# Patient Record
Sex: FEMALE | Race: WHITE | NOT HISPANIC OR LATINO | URBAN - NONMETROPOLITAN AREA
[De-identification: names, ages, dates, MRNs, and addresses within clinical notes are randomized per-mention and may not be internally consistent; named-entity substitution may affect disease eponyms.]

---

## 2022-07-11 DIAGNOSIS — R07.9 CHEST PAIN, UNSPECIFIED: ICD-10-CM

## 2022-07-11 DIAGNOSIS — E11.9 TYPE 2 DIABETES MELLITUS WITHOUT COMPLICATIONS (HCC): ICD-10-CM

## 2024-08-25 RX ORDER — METHADONE HYDROCHLORIDE 5 MG/1
TABLET ORAL
COMMUNITY
Start: 2024-03-01

## 2024-08-25 RX ORDER — ASPIRIN 81 MG/1
81 TABLET ORAL
COMMUNITY
Start: 2024-03-01

## 2024-08-25 RX ORDER — LISINOPRIL 2.5 MG/1
2.5 TABLET ORAL DAILY
COMMUNITY

## 2024-08-25 RX ORDER — OXYCODONE AND ACETAMINOPHEN 5; 325 MG/1; MG/1
1 TABLET ORAL DAILY
COMMUNITY

## 2024-08-25 RX ORDER — ATORVASTATIN CALCIUM 20 MG/1
20 TABLET, FILM COATED ORAL
COMMUNITY
Start: 2024-03-01

## 2024-08-30 ENCOUNTER — OFFICE VISIT (OUTPATIENT)
Dept: PODIATRY | Age: 64
End: 2024-08-30
Payer: OTHER GOVERNMENT

## 2024-08-30 VITALS
WEIGHT: 230.2 LBS | DIASTOLIC BLOOD PRESSURE: 80 MMHG | SYSTOLIC BLOOD PRESSURE: 136 MMHG | TEMPERATURE: 98.5 F | HEART RATE: 88 BPM | BODY MASS INDEX: 38.35 KG/M2 | HEIGHT: 65 IN | OXYGEN SATURATION: 96 %

## 2024-08-30 DIAGNOSIS — M79.671 PAIN IN BOTH FEET: ICD-10-CM

## 2024-08-30 DIAGNOSIS — B35.1 ONYCHOMYCOSIS: ICD-10-CM

## 2024-08-30 DIAGNOSIS — M79.672 PAIN IN BOTH FEET: ICD-10-CM

## 2024-08-30 DIAGNOSIS — M20.42 HAMMER TOE OF LEFT FOOT: ICD-10-CM

## 2024-08-30 DIAGNOSIS — E11.65 CONTROLLED TYPE 2 DIABETES MELLITUS WITH HYPERGLYCEMIA, UNSPECIFIED WHETHER LONG TERM INSULIN USE (HCC): Primary | ICD-10-CM

## 2024-08-30 PROCEDURE — 99203 OFFICE O/P NEW LOW 30 MIN: CPT | Performed by: STUDENT IN AN ORGANIZED HEALTH CARE EDUCATION/TRAINING PROGRAM

## 2024-08-30 RX ORDER — BACLOFEN 10 MG/1
10 TABLET ORAL AS NEEDED
COMMUNITY
Start: 2024-07-19

## 2024-08-30 RX ORDER — CICLOPIROX 80 MG/ML
SOLUTION TOPICAL
Qty: 6 ML | Refills: 3 | Status: SHIPPED | OUTPATIENT
Start: 2024-08-30

## 2024-08-30 NOTE — PROGRESS NOTES
Ambulatory Visit  Name: Estefania Thompson      : 1960      MRN: [id number]  Encounter Provider: Rose Garcia DPM  Encounter Date: 2024   Encounter department: [facility] PODIATRY [place]    Assessment & Plan   1. Controlled type 2 diabetes mellitus with hyperglycemia, unspecified whether long term insulin use (HCC)  2. Onychomycosis  -     ciclopirox (PENLAC) 8 % solution; Apply topically daily at bedtime File nail in thickness weekly  3. Pain in both feet  4. Hammer toe of left foot      IMPRESSION:  DM, A1c 9.1% 7/15/24  R2 onychomycosis  B/L foot pain, fatigue   Left 2/3 HTs     PLAN:  I reviewed clinical exam with patient in detail today. I have discussed with the patient the pathophysiology of this diagnosis and reviewed how the examination correlates with this diagnosis.  PCP note from 7/15/24 reviewed  DFE as below. Patient does not meet Q findings at this time thus RFC not covered  I discussed treatment options for toenail fungus with patient in great detail today. Patient has decided on topical penlac daily application (with weekly filing in thickness) for 6-12mo. We discussed that nails take anywhere from 6-12 months to fully grow out and thus she should continue to see improvement in distal nails as time progresses.   I discussed that even with sufficient topical treatment, cure rate is <15% and there is high chance of recurrent toenail fungal infection even if cure is achieved.   I discussed general foot hygiene such as keep feet clean and dry, wearing clean socks, washing socks in hot water + laundry , cleaning showers regularly, avoiding keeping shoes in dark/wark spots, using antifungal shoe spray, alternating shoes or replacing shoes all together    I recommend stiff bottomed sneakers/shoes (ex Asics, Vionic, New balance, Nagel, etc) for daily use and Travis Waldrop (recovery slides) for in home use.   I advised pt to obtain OTC powerstep or superfeet arch  "supports  I discussed hammer toes. These are painful. I do not recommend cosmetic surgery especially on diabetics.   F/u in 12mo for yearly DFE and nail recheck    History of Present Illness     Estefania Thompson is a 64 y.o. female who presents for DFE, right 2nd toenail dystrophy, B/L foot pain/fatigue at end of day, Left hammer toes which are painful. No prior tx. Denies N/T/B.     Review of Systems   Constitutional:  Negative for activity change, chills and fever.   HENT: Negative.     Respiratory:  Negative for cough, chest tightness and shortness of breath.    Cardiovascular:  Negative for chest pain and leg swelling.   Endocrine: Negative.    Genitourinary: Negative.    Neurological: Negative.  Negative for numbness.   Psychiatric/Behavioral: Negative.  Negative for agitation and behavioral problems.        Objective     /80   Pulse 88   Temp 98.5 °F (36.9 °C) (Temporal)   Ht 5' 5\" (1.651 m)   Wt 104 kg (230 lb 3.2 oz)   SpO2 96%   BMI 38.31 kg/m²     Physical Exam  Constitutional:       General: She is not in acute distress.     Appearance: Normal appearance. She is obese. She is not ill-appearing.   Cardiovascular:      Pulses: no weak pulses.           Dorsalis pedis pulses are 2+ on the right side and 2+ on the left side.        Posterior tibial pulses are 1+ on the right side and 1+ on the left side.      Comments: Legs to toes warm to cool. Pedal hair present. B/L LE mild varicosities.   Pulmonary:      Effort: Pulmonary effort is normal.   Musculoskeletal:         General: No tenderness (no pain on exam today). Normal range of motion.   Feet:      Right foot:      Skin integrity: No ulcer, skin breakdown, erythema, warmth, callus or dry skin.      Left foot:      Skin integrity: No ulcer, skin breakdown, erythema, warmth, callus or dry skin.   Skin:     General: Skin is warm.      Capillary Refill: Capillary refill takes less than 2 seconds.      Comments: Atrophic skin to LE - thin, dry and " shiny.   No open wounds.  Right 2nd toenail is thickened, yellowed with subungual debris and deformity.   Neurological:      General: No focal deficit present.      Mental Status: She is alert and oriented to person, place, and time.      Comments: - N/T/B   Psychiatric:         Mood and Affect: Mood normal.         Diabetic Foot Exam    Patient's shoes and socks removed.    Right Foot/Ankle   Right Foot Inspection  Skin Exam: skin normal and skin intact. No dry skin, no warmth, no callus, no erythema, no maceration, no abnormal color, no pre-ulcer, no ulcer and no callus.     Toe Exam:  no right toe deformity    Sensory   Vibration: absent  Proprioception: absent  Monofilament testing: intact    Vascular  Capillary refills: < 3 seconds  The right DP pulse is 2+. The right PT pulse is 1+.     Left Foot/Ankle  Left Foot Inspection  Skin Exam: skin normal and skin intact. No dry skin, no warmth, no erythema, no maceration, normal color, no pre-ulcer, no ulcer and no callus.     Toe Exam: left toe deformity (HT 2/3).     Sensory   Vibration: absent  Proprioception: absent  Monofilament testing: intact    Vascular  Capillary refills: < 3 seconds  The left DP pulse is 2+. The left PT pulse is 1+.     Assign Risk Category  Deformity present  No loss of protective sensation  No weak pulses  Risk: 0      Administrative Statements

## 2024-08-30 NOTE — PATIENT INSTRUCTIONS
Wear supportive shoes at all times. Avoid flip-flops, flat sandals, barefoot walking (never walk barefoot, even at home). Generally avoid shoes that are too flexible and bend in the arch. Your shoes should only slightly bend in the toe area, not the middle. Running sneakers are often the best choice.  Supportive sneaker brands: Nagel, On Cloud, Hoka, Altra, New Balance, Asics, Mizuno  [employer] (discount if mention Dr referred)  Fleet Feet [facility] [place]   [employer]  Supportive daily shoe brands: Vionic, Orthofeet, Apple, Dansko, Chacos, Magana, Teva, Birkenstock  Supportive home shoes: Travis Waldrop (recovery slides)  Look in to over the counter shoe inserts/arch supports such as Superfeet, Powerstep arch supports. These are all available on [employer] as well as their individual website's.

## 2025-03-17 ENCOUNTER — TELEPHONE (OUTPATIENT)
Dept: PODIATRY | Age: 65
End: 2025-03-17

## 2025-03-17 NOTE — TELEPHONE ENCOUNTER
Called and spoke with patient she is leaving for vacation for a week and asked if the office could call her in a week when she returns to reschedule appointment for 8/27/25 with Dr. Garcia